# Patient Record
Sex: FEMALE | Race: WHITE | URBAN - NONMETROPOLITAN AREA
[De-identification: names, ages, dates, MRNs, and addresses within clinical notes are randomized per-mention and may not be internally consistent; named-entity substitution may affect disease eponyms.]

---

## 2022-01-11 VITALS — HEIGHT: 67 IN

## 2022-01-12 ENCOUNTER — OFFICE VISIT (OUTPATIENT)
Dept: OBGYN CLINIC | Age: 37
End: 2022-01-12
Payer: COMMERCIAL

## 2022-01-12 ENCOUNTER — HOSPITAL ENCOUNTER (OUTPATIENT)
Age: 37
Setting detail: SPECIMEN
Discharge: HOME | End: 2022-01-12

## 2022-01-12 VITALS
BODY MASS INDEX: 35.94 KG/M2 | DIASTOLIC BLOOD PRESSURE: 76 MMHG | WEIGHT: 229 LBS | SYSTOLIC BLOOD PRESSURE: 124 MMHG | HEIGHT: 67 IN

## 2022-01-12 DIAGNOSIS — N39.0 RECURRENT UTI: ICD-10-CM

## 2022-01-12 DIAGNOSIS — Z01.419 WOMEN'S ANNUAL ROUTINE GYNECOLOGICAL EXAMINATION: Primary | ICD-10-CM

## 2022-01-12 PROBLEM — E78.1 HYPERTRIGLYCERIDEMIA: Status: ACTIVE | Noted: 2017-11-29

## 2022-01-12 PROBLEM — E28.2 POLYCYSTIC OVARIES: Status: ACTIVE | Noted: 2017-08-21

## 2022-01-12 PROCEDURE — 99385 PREV VISIT NEW AGE 18-39: CPT

## 2022-01-12 RX ORDER — NITROFURANTOIN MACROCRYSTALS 50 MG/1
50 CAPSULE ORAL NIGHTLY
Qty: 30 CAPSULE | Refills: 1 | Status: SHIPPED | OUTPATIENT
Start: 2022-01-12 | End: 2022-02-11

## 2022-01-12 ASSESSMENT — PATIENT HEALTH QUESTIONNAIRE - PHQ9
SUM OF ALL RESPONSES TO PHQ9 QUESTIONS 1 & 2: 0
SUM OF ALL RESPONSES TO PHQ QUESTIONS 1-9: 0
SUM OF ALL RESPONSES TO PHQ QUESTIONS 1-9: 0
2. FEELING DOWN, DEPRESSED OR HOPELESS: 0
1. LITTLE INTEREST OR PLEASURE IN DOING THINGS: 0
SUM OF ALL RESPONSES TO PHQ QUESTIONS 1-9: 0
SUM OF ALL RESPONSES TO PHQ QUESTIONS 1-9: 0

## 2022-01-12 ASSESSMENT — ENCOUNTER SYMPTOMS
SHORTNESS OF BREATH: 0
DIARRHEA: 0
ABDOMINAL PAIN: 0
CONSTIPATION: 0

## 2022-01-12 NOTE — PROGRESS NOTES
2018    Last Dexascan Never    Last colorectal screen- type:Never    Do you do self breast exams: No    Past Medical History:   Diagnosis Date    Infertility, female        Past Surgical History:   Procedure Laterality Date    GALLBL[place]DER SURGERY  2019   [place]    LAPAROSCOPY  2015    [facility]    STOMACH SURGERY  1988    ruptured stomach     TONSILLECTOMY  1998    WISDOM TOOTH EXTRACTION         Family History   Problem Relation Age of Onset    Hypertension Father     Hypertension Brother     Heart Disease Maternal Grandfather     Heart Disease Paternal Grandfather     Stroke Paternal Grandfather     Breast Cancer Maternal Cousin     Breast Cancer Paternal Aunt        Chief Complaint   Patient presents with    Gynecologic Exam     Patient is being seen for yearly and pap. PE:  Vital Signs  Blood pressure 124/76, height 5' 7\" (1.702 m), weight 229 lb (103.9 kg), last menstrual period 12/20/2021, not currently breastfeeding. Estimated body mass index is 35.87 kg/m² as calculated from the following:    Height as of this encounter: 5' 7\" (1.702 m). Weight as of this encounter: 229 lb (103.9 kg). Labs:    No results found for this visit on 01/12/22. PHQ-9 Total Score: 0 (1/12/2022  1:33 PM)      NURSE: Jarad DOVE      HPI: Patient presents for annual visit. She denies breast concerns. Encouraged SBE. She reports that she has gotten a UTI after her last 2 periods and denies any recent behavior changes. Sent macrodantin and instructed her to take dose on last day of periods and 2 days following periods. She will call if she has issues or if medication doesn't help. She reports that she has ongoing bowel issues due to endometriosis but no recent change. She reports that her periods are heavy and crampy in nature, but this is also typical for her. Provided options for cycle control and instructed to call if she is interested in pursuing.   She denies vaginal discharge or pain with intercourse. Review of Systems   Constitutional: Negative for chills, fatigue and fever. Respiratory: Negative for shortness of breath. Cardiovascular: Negative for chest pain. Gastrointestinal: Negative for abdominal pain, constipation and diarrhea. Genitourinary: Negative for dyspareunia, dysuria, frequency, menstrual problem, pelvic pain, urgency, vaginal bleeding and vaginal discharge. Neurological: Negative for dizziness, light-headedness and headaches. Objective  Physical Exam  Constitutional:       Appearance: Normal appearance. Genitourinary:      Vulva normal.      No vaginal discharge, erythema, tenderness or bleeding. Right Adnexa: not tender and not full. Left Adnexa: not tender and not full. No cervical motion tenderness or discharge. Uterus is not enlarged or tender. Pelvic exam was performed with patient in the lithotomy position. Breasts: Breasts are symmetrical.      Right: No inverted nipple, nipple discharge, skin change or tenderness. Left: No inverted nipple, nipple discharge, skin change or tenderness. HENT:      Head: Normocephalic and atraumatic. Mouth/Throat:      Mouth: Mucous membranes are moist.   Eyes:      Extraocular Movements: Extraocular movements intact. Cardiovascular:      Rate and Rhythm: Normal rate. Pulmonary:      Effort: Pulmonary effort is normal.   Abdominal:      General: There is no distension. Palpations: Abdomen is soft. Tenderness: There is no abdominal tenderness. Musculoskeletal:         General: Normal range of motion. Cervical back: Normal range of motion. Neurological:      General: No focal deficit present. Mental Status: She is alert and oriented to person, place, and time. Skin:     General: Skin is warm and dry. Psychiatric:         Mood and Affect: Mood normal.         Behavior: Behavior normal.         Thought Content:  Thought content normal. Judgment: Judgment normal.   Chaperone present: chaperone declined. Assessment and Plan          Diagnosis Orders   1. Women's annual routine gynecological examination  PAP SMEAR   2. Recurrent UTI               I am having Karon Stephen start on nitrofurantoin. I am also having her maintain her metFORMIN. Return in about 1 year (around 1/12/2023) for annual.    She was also counseled on her preventative health maintenance recommendations and follow-up. There are no Patient Instructions on file for this visit.     Eden Amos PA-C,1/12/2022 2:29 PM

## 2022-01-20 LAB
HPV SAMPLE: NORMAL
HPV, GENOTYPE 16: NOT DETECTED
HPV, GENOTYPE 18: NOT DETECTED
HPV, HIGH RISK OTHER: NOT DETECTED
HPV, INTERPRETATION: NORMAL
SPECIMEN DESCRIPTION: NORMAL

## 2022-01-21 LAB — CYTOLOGY REPORT: NORMAL

## 2023-01-17 ENCOUNTER — OFFICE VISIT (OUTPATIENT)
Dept: OBGYN CLINIC | Age: 38
End: 2023-01-17
Payer: COMMERCIAL

## 2023-01-17 VITALS
DIASTOLIC BLOOD PRESSURE: 82 MMHG | WEIGHT: 203.6 LBS | BODY MASS INDEX: 31.96 KG/M2 | HEIGHT: 67 IN | SYSTOLIC BLOOD PRESSURE: 130 MMHG

## 2023-01-17 DIAGNOSIS — Z01.419 VISIT FOR GYNECOLOGIC EXAMINATION: Primary | ICD-10-CM

## 2023-01-17 PROCEDURE — 99395 PREV VISIT EST AGE 18-39: CPT

## 2023-01-17 RX ORDER — MULTIVITAMIN
1 TABLET ORAL DAILY
COMMUNITY

## 2023-01-17 ASSESSMENT — ENCOUNTER SYMPTOMS
ABDOMINAL PAIN: 0
SHORTNESS OF BREATH: 0
CONSTIPATION: 0
DIARRHEA: 0

## 2023-01-17 NOTE — PROGRESS NOTES
YEARLY PHYSICAL    Date of service: 2023    Rafaela Cervantes  Is a 44 y.o.   female    PT's PCP is: Jenny No     : 1985                                         Chaperone for Intimate Exam  Chaperone was offered as part of the rooming process. Patient declined and agrees to continue with exam without a chaperone. Subjective:       Patient's last menstrual period was 2022 (approximate). Are your menses regular: yes    OB History    Para Term  AB Living   1 1 1 0 0 1   SAB IAB Ectopic Molar Multiple Live Births   0 0 0 0 0 1      # Outcome Date GA Lbr Marck/2nd Weight Sex Delivery Anes PTL Lv   1 Term 16 39w0d  6 lb 7 oz (2.92 kg) M Vag-Spont EPI N HERNAN        Social History     Tobacco Use   Smoking Status Former    Packs/day: 0.50    Years: 15.00    Pack years: 7.50    Types: Cigarettes   Smokeless Tobacco Never        Social History     Substance and Sexual Activity   Alcohol Use Yes    Alcohol/week: 1.0 standard drink    Types: 1 Glasses of wine per week    Comment: social       Family History   Problem Relation Age of Onset    Heart Disease Paternal Grandfather     Stroke Paternal Grandfather     Heart Disease Maternal Grandfather     Heart Surgery Maternal Grandfather     Hypertension Father     Deep Vein Thrombosis Maternal Aunt     Deep Vein Thrombosis Maternal Aunt     Breast Cancer Paternal Aunt     Breast Cancer Maternal Cousin        Any family history of breast or ovarian cancer:  Yes    Any family history of blood clots: Yes      Allergies: Amoxicillin and Bactrim [sulfamethoxazole-trimethoprim]      Current Outpatient Medications:     Multiple Vitamin (MULTIVITAMIN) tablet, Take 1 tablet by mouth daily, Disp: , Rfl:     metFORMIN (GLUCOPHAGE) 500 MG tablet, Take two tablets by mouth in the am and one tablet by mouth in the pm, Disp: , Rfl:     Social History     Substance and Sexual Activity   Sexual Activity Yes    Partners: Male    Birth control/protection: Condom       Any bleeding or pain with intercourse: No    Last Yearly:  1/12/22    Last pap: 1/12/22NL    Last HPV: 1/12/22 Neg    Last Mammogram: 2018    Last [place] never    Last colorectal screen- type:never    Do you do self breast exams: No    Past Medical History:   Diagnosis Date    Endometriosis 2015    Found during laparoscopy    Infertility, female     Postpartum depression 2016       Past Surgical History:   Procedure Laterality Date    GALLBLADDER SURGERY  2019    [facility] [address]    LAPAROSCOPY  2015    [facility]    STOMACH SURGERY  1988    ruptured stomach     TONSILLECTOMY  0112    UMBILICAL HERNIA REPAIR  1989    WISDOM TOOTH EXTRACTION         Family History   Problem Relation Age of Onset    Heart Disease Paternal Grandfather     Stroke Paternal Grandfather     Heart Disease Maternal Grandfather     Heart Surgery Maternal Grandfather     Hypertension Father     Deep Vein Thrombosis Maternal Aunt     Deep Vein Thrombosis Maternal Aunt     Breast Cancer Paternal Aunt     Breast Cancer Maternal Cousin        Chief Complaint   Patient presents with    Annual Exam     Here for annual exam. Last annual and pap 1/12/22 NL/Neg. No issues or concerns today. PE:  Vital Signs  Blood pressure 130/82, height 5' 7\" (1.702 m), weight 203 lb 9.6 oz (92.4 kg), last menstrual period 12/24/2022, not currently breastfeeding. Estimated body mass index is 31.89 kg/m² as calculated from the following:    Height as of this encounter: 5' 7\" (1.702 m). Weight as of this encounter: 203 lb 9.6 oz (92.4 kg). Labs:    No results found for this visit on 01/17/23. No data recorded    NURSE: DEYSI atkins RN    HPI: Patient presents for annual visit. Denies breast concerns. Encouraged SBE. Denies bowel/bladder concerns. Denies abnormal discharge, pain with intercourse.   Reports that periods continue to be heavy with cramping but this is normal for her and she does not want to pursue any options at this time. Pap smear WNL in 2022. Review of Systems   Constitutional:  Negative for chills, fatigue and fever. Respiratory:  Negative for shortness of breath. Cardiovascular:  Negative for chest pain. Gastrointestinal:  Negative for abdominal pain, constipation and diarrhea. Genitourinary:  Positive for menstrual problem (heavy, cramping). Negative for dyspareunia, dysuria, frequency, pelvic pain, urgency, vaginal bleeding and vaginal discharge. Neurological:  Negative for dizziness, light-headedness and headaches. Physical Exam  Constitutional:       Appearance: Normal appearance. Genitourinary:      Vulva normal.      Right Labia: No rash, tenderness or lesions. Left Labia: No tenderness, lesions or rash. No vaginal discharge, tenderness or bleeding. Right Adnexa: not tender and not full. Left Adnexa: not tender and not full. No cervical motion tenderness. Uterus is not enlarged or tender. Pelvic exam was performed with patient in the lithotomy position. Breasts:     Breasts are symmetrical.      Right: No inverted nipple, nipple discharge, skin change or tenderness. Left: No inverted nipple, nipple discharge, skin change or tenderness. HENT:      Head: Normocephalic and atraumatic. Mouth/Throat:      Mouth: Mucous membranes are moist.   Eyes:      Extraocular Movements: Extraocular movements intact. Cardiovascular:      Rate and Rhythm: Normal rate. Pulmonary:      Effort: Pulmonary effort is normal.   Abdominal:      General: There is no distension. Palpations: Abdomen is soft. Tenderness: There is no abdominal tenderness. Musculoskeletal:         General: Normal range of motion. Cervical back: Normal range of motion. Neurological:      General: No focal deficit present. Mental Status: She is alert and oriented to person, place, and time. Skin:     General: Skin is warm and dry. Psychiatric:         Mood and Affect: Mood normal.         Behavior: Behavior normal.         Thought Content: Thought content normal.         Judgment: Judgment normal.   Chaperone present: chaperone declined. Assessment and Plan          Diagnosis Orders   1. Visit for gynecologic examination            Repeat Annual every 1 year  Cervical Cytology Evaluation begins at 24years old. If Negative Cytology, Follow-up screening per current guidelines. Mammograms every 1year. If 35 yo and last mammogram was negative. Routine healthmaintenance per patients PCP. I am having Elif Bowers maintain her metFORMIN and multivitamin. Return in about 1 year (around 1/17/2024) for annual.    She was also counseled on her preventative health maintenance recommendations and follow-up. There are no Patient Instructions on file for this visit.     Alysha Childs PA-C,1/17/2023 1:27 PM

## 2024-01-23 ENCOUNTER — OFFICE VISIT (OUTPATIENT)
Dept: OBGYN CLINIC | Age: 39
End: 2024-01-23
Payer: COMMERCIAL

## 2024-01-23 VITALS
DIASTOLIC BLOOD PRESSURE: 82 MMHG | BODY MASS INDEX: 29.6 KG/M2 | SYSTOLIC BLOOD PRESSURE: 120 MMHG | WEIGHT: 188.6 LBS | HEIGHT: 67 IN

## 2024-01-23 DIAGNOSIS — Z01.419 VISIT FOR GYNECOLOGIC EXAMINATION: Primary | ICD-10-CM

## 2024-01-23 PROCEDURE — 99395 PREV VISIT EST AGE 18-39: CPT

## 2024-01-23 RX ORDER — OMEPRAZOLE 20 MG/1
20 CAPSULE, DELAYED RELEASE ORAL DAILY
COMMUNITY
Start: 2023-11-20

## 2024-01-23 ASSESSMENT — ENCOUNTER SYMPTOMS
ABDOMINAL PAIN: 0
CONSTIPATION: 0
SHORTNESS OF BREATH: 0
DIARRHEA: 0

## 2024-01-23 NOTE — PROGRESS NOTES
Skin is warm and dry.   Psychiatric:         Mood and Affect: Mood normal.         Behavior: Behavior normal.         Thought Content: Thought content normal.         Judgment: Judgment normal.   Chaperone present: chaperone declined.                                 Assessment and Plan          Diagnosis Orders   1. Visit for gynecologic examination          Repeat Annual every 1 year  Cervical Cytology Evaluation begins at 21 years old.  If Negative Cytology, Follow-up screening per current guidelines.    Mammograms every 1year. If 39 yo and last mammogram was negative.  Routine healthmaintenance per patients PCP.      I am having Karon Mitchell maintain her metFORMIN, multivitamin, and omeprazole.    Return in about 1 year (around 1/23/2025) for annual.    She was also counseled on her preventative health maintenance recommendations and follow-up.     There are no Patient Instructions on file for this visit.    Wendy Roger PA-C,1/23/2024 10:13 AM

## 2025-01-28 ENCOUNTER — HOSPITAL ENCOUNTER (OUTPATIENT)
Age: 40
Setting detail: SPECIMEN
Discharge: HOME | End: 2025-01-28

## 2025-01-28 ENCOUNTER — OFFICE VISIT (OUTPATIENT)
Dept: OBGYN CLINIC | Age: 40
End: 2025-01-28
Payer: COMMERCIAL

## 2025-01-28 VITALS
BODY MASS INDEX: 30.29 KG/M2 | WEIGHT: 193 LBS | DIASTOLIC BLOOD PRESSURE: 82 MMHG | HEIGHT: 67 IN | SYSTOLIC BLOOD PRESSURE: 112 MMHG

## 2025-01-28 DIAGNOSIS — Z01.419 VISIT FOR GYNECOLOGIC EXAMINATION: Primary | ICD-10-CM

## 2025-01-28 PROCEDURE — 99459 PELVIC EXAMINATION: CPT

## 2025-01-28 PROCEDURE — 99396 PREV VISIT EST AGE 40-64: CPT

## 2025-01-28 SDOH — ECONOMIC STABILITY: FOOD INSECURITY: WITHIN THE PAST 12 MONTHS, YOU WORRIED THAT YOUR FOOD WOULD RUN OUT BEFORE YOU GOT MONEY TO BUY MORE.: NEVER TRUE

## 2025-01-28 SDOH — ECONOMIC STABILITY: FOOD INSECURITY: WITHIN THE PAST 12 MONTHS, THE FOOD YOU BOUGHT JUST DIDN'T LAST AND YOU DIDN'T HAVE MONEY TO GET MORE.: NEVER TRUE

## 2025-01-28 NOTE — PROGRESS NOTES
YEARLY PHYSICAL    Date of service: 2025    Karon Mitchell  Is a 39 y.o. female    PT's PCP is: Gagan Oleary MD     : 1985                                         Chaperone for Intimate Exam  Chaperone was offered and accepted as part of the rooming process.  Chaperone: Sarah CABRERA student      Subjective:       Patient's last menstrual period was 2025 (exact date).     Are your menses regular: yes    OB History    Para Term  AB Living   1 1 1 0 0 1   SAB IAB Ectopic Molar Multiple Live Births   0 0 0 0 0 1      # Outcome Date GA Lbr Marck/2nd Weight Sex Type Anes PTL Lv   1 Term 16 39w0d  2.92 kg (6 lb 7 oz) M Vag-Spont EPI N HERNAN        Social History     Tobacco Use   Smoking Status Former    Current packs/day: 0.50    Average packs/day: 0.5 packs/day for 15.0 years (7.5 ttl pk-yrs)    Types: Cigarettes   Smokeless Tobacco Never        Social History     Substance and Sexual Activity   Alcohol Use Yes    Alcohol/week: 1.0 standard drink of alcohol    Types: 1 Glasses of wine per week    Comment: social       Family History   Problem Relation Age of Onset    Heart Disease Paternal Grandfather     Stroke Paternal Grandfather     Heart Disease Maternal Grandfather     Heart Surgery Maternal Grandfather     Hypertension Father     Hypertension Mother     Deep Vein Thrombosis Maternal Aunt     Deep Vein Thrombosis Maternal Aunt     Breast Cancer Paternal Aunt     Breast Cancer Maternal Cousin        Any family history of breast or ovarian cancer: Yes    Any family history of blood clots: Yes      Allergies: Amoxicillin and Bactrim [sulfamethoxazole-trimethoprim]      Current Outpatient Medications:     BUSPIRONE HCL PO, Take 5 mg by mouth 1/2 tablet bid, Disp: , Rfl:     Multiple Vitamin (MULTIVITAMIN) tablet, Take 1 tablet by mouth daily, Disp: , Rfl:     metFORMIN (GLUCOPHAGE) 500 MG tablet, Take two

## 2025-01-30 LAB
HPV I/H RISK 4 DNA CVX QL NAA+PROBE: NOT DETECTED
HPV SAMPLE: NORMAL
HPV, INTERPRETATION: NORMAL
HPV16 DNA CVX QL NAA+PROBE: NOT DETECTED
HPV18 DNA CVX QL NAA+PROBE: NOT DETECTED
SPECIMEN DESCRIPTION: NORMAL

## 2025-02-03 ASSESSMENT — ENCOUNTER SYMPTOMS
SHORTNESS OF BREATH: 0
DIARRHEA: 0
ABDOMINAL PAIN: 0
CONSTIPATION: 0

## 2025-02-06 LAB — CYTOLOGY REPORT: NORMAL

## 2025-08-28 ENCOUNTER — TELEPHONE (OUTPATIENT)
Dept: OBGYN CLINIC | Age: 40
End: 2025-08-28